# Patient Record
Sex: FEMALE | Race: WHITE | NOT HISPANIC OR LATINO | Employment: PART TIME | URBAN - METROPOLITAN AREA
[De-identification: names, ages, dates, MRNs, and addresses within clinical notes are randomized per-mention and may not be internally consistent; named-entity substitution may affect disease eponyms.]

---

## 2018-03-09 ENCOUNTER — OFFICE VISIT (OUTPATIENT)
Dept: OBGYN CLINIC | Facility: CLINIC | Age: 18
End: 2018-03-09
Payer: COMMERCIAL

## 2018-03-09 VITALS
BODY MASS INDEX: 23.16 KG/M2 | DIASTOLIC BLOOD PRESSURE: 62 MMHG | WEIGHT: 139 LBS | HEIGHT: 65 IN | SYSTOLIC BLOOD PRESSURE: 100 MMHG

## 2018-03-09 DIAGNOSIS — Z01.419 ENCOUNTER FOR GYNECOLOGICAL EXAMINATION WITHOUT ABNORMAL FINDING: Primary | ICD-10-CM

## 2018-03-09 DIAGNOSIS — Z11.3 SCREEN FOR STD (SEXUALLY TRANSMITTED DISEASE): ICD-10-CM

## 2018-03-09 PROCEDURE — 87491 CHLMYD TRACH DNA AMP PROBE: CPT | Performed by: PHYSICIAN ASSISTANT

## 2018-03-09 PROCEDURE — S0612 ANNUAL GYNECOLOGICAL EXAMINA: HCPCS | Performed by: PHYSICIAN ASSISTANT

## 2018-03-09 PROCEDURE — 87591 N.GONORRHOEAE DNA AMP PROB: CPT | Performed by: PHYSICIAN ASSISTANT

## 2018-03-09 NOTE — PROGRESS NOTES
Neena CANDIS Blancas  2000      CC:  Yearly exam    S:  19 y o  female here for yearly exam  She has no complaints  Her cycles are regular, not heavy or crampy  She is sexually active and uses condoms for contraception  She does want STD testing today  She is interested in birth control  We reviewed the risks and benefits of pills, patches, NuvaRing, Depo, [place] and Mirena  She is very interested in [place]  No current outpatient prescriptions on file  Social History     Social History    Marital status: Single     Spouse name: N/A    Number of children: N/A    Years of education: N/A     Occupational History    Not on file  Social History Main Topics    Smoking status: Never Smoker    Smokeless tobacco: Never Used    Alcohol use Yes      Comment: Social alcohol use    Drug use: No    Sexual activity: Yes     Partners: Male     Birth control/ protection: Condom     Other Topics Concern    Not on file     Social History Narrative    Activities-Swimming    Drinks 1 cup of coffee daily         Family History   Problem Relation Age of Onset    Asthma Mother     Other Mother      Hysterectomy, menorrhalgia    Heart attack Father     Breast cancer Maternal Grandmother      malignant neoplasm of breast    Canavan disease Family     Cancer Family     Cancer Other     Heart attack Other      Past Medical History:   Diagnosis Date    Menorrhagia with regular cycle          O:   Blood pressure 100/62, height 5' 4 96" (1 65 m), weight 63 kg (139 lb), last menstrual period 02/17/2018  Patient appears well and is not in distress  Neck is supple without masses  Breasts are symmetrical without mass, tenderness, nipple discharge, skin changes or adenopathy  Abdomen is soft and nontender without masses  A:  Yearly exam      P:  Pt will read over Chatty booklet and return paperwork  She goes tos school at [employer] so will likely return over the summer break for IUD isnertion    Consistent condom use recommended  She will return in one year for her yearly exam or sooner as needed

## 2018-03-14 LAB
CHLAMYDIA DNA CVX QL NAA+PROBE: NORMAL
N GONORRHOEA DNA GENITAL QL NAA+PROBE: NORMAL

## 2018-07-02 ENCOUNTER — TELEPHONE (OUTPATIENT)
Dept: OBGYN CLINIC | Facility: CLINIC | Age: 18
End: 2018-07-02

## 2018-07-02 NOTE — TELEPHONE ENCOUNTER
Spoke to pt and explained she needs to return insurance check list and we will then contact her about the insertion  She understands that

## 2018-07-02 NOTE — TELEPHONE ENCOUNTER
Pt states she got her period today and wants to come in tomorrow for Manny Section insertion  I informed her she needs to first return iud insurance checklist  And bring it to the office so it can be ordered

## 2018-07-02 NOTE — TELEPHONE ENCOUNTER
Ilan Hence,  We need to verify pharmacy coverage for her Creig Halsted, please tell pt she needs to return IUD check list ASAP  Thank you

## 2018-07-02 NOTE — TELEPHONE ENCOUNTER
Pt (and her mother also) called requesting to have the [facility] inserted tomorrow or Thursday  She is stating she lives in [place] and will be home-periods are infrequent and irregular- states her insurance has been contacted but has not returned the checklist  Is it possible to get it this week? Pt was under the impression we would do it whenever she got her period

## 2018-07-19 ENCOUNTER — TELEPHONE (OUTPATIENT)
Dept: OBGYN CLINIC | Facility: CLINIC | Age: 18
End: 2018-07-19

## 2018-07-19 NOTE — TELEPHONE ENCOUNTER
Pt called - needs her 3/9/18 STD test results faxed to:   Dyllan Eaton: [facility]  Fax # [phone]

## 2018-07-20 ENCOUNTER — TELEPHONE (OUTPATIENT)
Dept: OBGYN CLINIC | Facility: CLINIC | Age: 18
End: 2018-07-20

## 2019-03-12 ENCOUNTER — HOSPITAL ENCOUNTER (EMERGENCY)
Facility: HOSPITAL | Age: 19
Discharge: HOME | End: 2019-03-12
Attending: EMERGENCY MEDICINE | Admitting: EMERGENCY MEDICINE
Payer: COMMERCIAL

## 2019-03-12 ENCOUNTER — APPOINTMENT (EMERGENCY)
Dept: RADIOLOGY | Facility: HOSPITAL | Age: 19
End: 2019-03-12
Payer: COMMERCIAL

## 2019-03-12 VITALS
OXYGEN SATURATION: 100 % | DIASTOLIC BLOOD PRESSURE: 59 MMHG | RESPIRATION RATE: 18 BRPM | SYSTOLIC BLOOD PRESSURE: 109 MMHG | TEMPERATURE: 98.5 F | WEIGHT: 135 LBS | BODY MASS INDEX: 22.49 KG/M2 | HEART RATE: 62 BPM

## 2019-03-12 DIAGNOSIS — N83.209 RUPTURED OVARIAN CYST: Primary | ICD-10-CM

## 2019-03-12 DIAGNOSIS — R10.9 ABDOMINAL PAIN: ICD-10-CM

## 2019-03-12 LAB
ALBUMIN SERPL BCP-MCNC: 3.9 G/DL (ref 3.5–5)
ALP SERPL-CCNC: 66 U/L (ref 46–116)
ALT SERPL W P-5'-P-CCNC: 14 U/L (ref 12–78)
ANION GAP SERPL CALCULATED.3IONS-SCNC: 6 MMOL/L (ref 4–13)
AST SERPL W P-5'-P-CCNC: 32 U/L (ref 5–45)
BACTERIA UR QL AUTO: ABNORMAL /HPF
BASOPHILS # BLD AUTO: 0.04 THOUSANDS/ΜL (ref 0–0.1)
BASOPHILS NFR BLD AUTO: 1 % (ref 0–1)
BILIRUB SERPL-MCNC: 0.25 MG/DL (ref 0.2–1)
BILIRUB UR QL STRIP: NEGATIVE
BUN SERPL-MCNC: 11 MG/DL (ref 5–25)
CALCIUM SERPL-MCNC: 8.7 MG/DL (ref 8.3–10.1)
CHLORIDE SERPL-SCNC: 107 MMOL/L (ref 100–108)
CLARITY UR: CLEAR
CO2 SERPL-SCNC: 26 MMOL/L (ref 21–32)
COLOR UR: YELLOW
COLOR, POC: NORMAL
CREAT SERPL-MCNC: 0.77 MG/DL (ref 0.6–1.3)
EOSINOPHIL # BLD AUTO: 0.04 THOUSAND/ΜL (ref 0–0.61)
EOSINOPHIL NFR BLD AUTO: 1 % (ref 0–6)
ERYTHROCYTE [DISTWIDTH] IN BLOOD BY AUTOMATED COUNT: 11.9 % (ref 11.6–15.1)
EXT PREG TEST URINE: NEGATIVE
GFR SERPL CREATININE-BSD FRML MDRD: 111 ML/MIN/1.73SQ M
GLUCOSE SERPL-MCNC: 95 MG/DL (ref 65–140)
GLUCOSE UR STRIP-MCNC: NEGATIVE MG/DL
HCT VFR BLD AUTO: 37.2 % (ref 34.8–46.1)
HGB BLD-MCNC: 12.3 G/DL (ref 11.5–15.4)
HGB UR QL STRIP.AUTO: NEGATIVE
HYALINE CASTS #/AREA URNS LPF: ABNORMAL /LPF
IMM GRANULOCYTES # BLD AUTO: 0.03 THOUSAND/UL (ref 0–0.2)
IMM GRANULOCYTES NFR BLD AUTO: 0 % (ref 0–2)
KETONES UR STRIP-MCNC: ABNORMAL MG/DL
LEUKOCYTE ESTERASE UR QL STRIP: ABNORMAL
LIPASE SERPL-CCNC: 96 U/L (ref 73–393)
LYMPHOCYTES # BLD AUTO: 2.96 THOUSANDS/ΜL (ref 0.6–4.47)
LYMPHOCYTES NFR BLD AUTO: 38 % (ref 14–44)
MCH RBC QN AUTO: 28.3 PG (ref 26.8–34.3)
MCHC RBC AUTO-ENTMCNC: 33.1 G/DL (ref 31.4–37.4)
MCV RBC AUTO: 86 FL (ref 82–98)
MONOCYTES # BLD AUTO: 0.62 THOUSAND/ΜL (ref 0.17–1.22)
MONOCYTES NFR BLD AUTO: 8 % (ref 4–12)
NEUTROPHILS # BLD AUTO: 4.12 THOUSANDS/ΜL (ref 1.85–7.62)
NEUTS SEG NFR BLD AUTO: 52 % (ref 43–75)
NITRITE UR QL STRIP: NEGATIVE
NON-SQ EPI CELLS URNS QL MICRO: ABNORMAL /HPF
NRBC BLD AUTO-RTO: 0 /100 WBCS
PH UR STRIP.AUTO: 7 [PH] (ref 4.5–8)
PLATELET # BLD AUTO: 216 THOUSANDS/UL (ref 149–390)
PMV BLD AUTO: 10.3 FL (ref 8.9–12.7)
POTASSIUM SERPL-SCNC: 3.7 MMOL/L (ref 3.5–5.3)
PROT SERPL-MCNC: 6.9 G/DL (ref 6.4–8.2)
PROT UR STRIP-MCNC: NEGATIVE MG/DL
RBC # BLD AUTO: 4.35 MILLION/UL (ref 3.81–5.12)
RBC #/AREA URNS AUTO: ABNORMAL /HPF
SODIUM SERPL-SCNC: 139 MMOL/L (ref 136–145)
SP GR UR STRIP.AUTO: 1.02 (ref 1–1.03)
UROBILINOGEN UR QL STRIP.AUTO: 0.2 E.U./DL
WBC # BLD AUTO: 7.81 THOUSAND/UL (ref 4.31–10.16)
WBC #/AREA URNS AUTO: ABNORMAL /HPF

## 2019-03-12 PROCEDURE — 83690 ASSAY OF LIPASE: CPT | Performed by: EMERGENCY MEDICINE

## 2019-03-12 PROCEDURE — 76830 TRANSVAGINAL US NON-OB: CPT

## 2019-03-12 PROCEDURE — 99284 EMERGENCY DEPT VISIT MOD MDM: CPT

## 2019-03-12 PROCEDURE — 85025 COMPLETE CBC W/AUTO DIFF WBC: CPT | Performed by: EMERGENCY MEDICINE

## 2019-03-12 PROCEDURE — 36415 COLL VENOUS BLD VENIPUNCTURE: CPT

## 2019-03-12 PROCEDURE — 81003 URINALYSIS AUTO W/O SCOPE: CPT

## 2019-03-12 PROCEDURE — 81025 URINE PREGNANCY TEST: CPT | Performed by: EMERGENCY MEDICINE

## 2019-03-12 PROCEDURE — 87591 N.GONORRHOEAE DNA AMP PROB: CPT | Performed by: EMERGENCY MEDICINE

## 2019-03-12 PROCEDURE — 87491 CHLMYD TRACH DNA AMP PROBE: CPT | Performed by: EMERGENCY MEDICINE

## 2019-03-12 PROCEDURE — 76856 US EXAM PELVIC COMPLETE: CPT

## 2019-03-12 PROCEDURE — 81001 URINALYSIS AUTO W/SCOPE: CPT

## 2019-03-12 PROCEDURE — 80053 COMPREHEN METABOLIC PANEL: CPT | Performed by: EMERGENCY MEDICINE

## 2019-03-13 NOTE — ED ATTENDING ATTESTATION
Shalom Reid MD, saw and evaluated the patient  I have discussed the patient with the resident/non-physician practitioner and agree with the resident's/non-physician practitioner's findings, Plan of Care, and MDM as documented in the resident's/non-physician practitioner's note, except where noted  All available labs and Radiology studies were reviewed  I was present for key portions of any procedure(s) performed by the resident/non-physician practitioner and I was immediately available to provide assistance  At this point I agree with the current assessment done in the Emergency Department  I have conducted an independent evaluation of this patient a history and physical is as follows:    Crampy intermittent lower abdominal pain for the last 1-2 months  Patient states that she feels sharp intermittent waxing and waning lower abdominal pain that radiates to her bilateral lower quadrants  Initially had been intermittent and widely spaced, but has been more constant today, and severe  No vomiting or diarrhea  No vaginal discharge or dysuria  States the pain did start while she was urinating, but did not have painful urination  Patient is sexually active with 10 total partners, last sexual activity about 2 months ago  Patient last saw gynecologist 2 months ago to have an IUD placed  She does not check the location of the IUD, and has never had an STD  Her review of systems otherwise negative in 12 systems were reviewed  On exam Vital signs were reviewed  Patient is awake, alert, interactive  The patient's pupils are equally round reactive to light  Oropharynx is clear with moist mucous membranes  Neck is supple and nontender with no adenopathy or JVD  Heart is regular with no murmurs, rubs, or gallops  Lungs are clear and equal with no wheezes, rales, or rhonchi  Abdomen is soft with mild suprapubic and bilateral lower quadrant tenderness with no masses, rebound, or guarding   There is no CVA tenderness  The patient was completely exposed  There is no skin breakdown  There are no rashes or skin changes  Extremities are warm and well perfused with good pulses  The patient has normal strength, sensation, and cranial nerves  Impression:  Lower abdominal pain    Will plan to check urine, will ultrasound, will do pelvic exam  Critical Care Time  Procedures

## 2019-03-13 NOTE — ED PROVIDER NOTES
History  Chief Complaint   Patient presents with    Abdominal Pain     sharp lower abdominal pain started at 1845, mild pain earlier this month but severe tonight, nausea no vomiting or diarrhea, unknown fever, no trouble urinating     24-year-old female presenting to the emergency department for evaluation of lower abdominal pain for the past month  Patient states this has been ongoing she will have approximately 1 or 2 attacks every other day and symptoms last approximately 10 minutes it is sharp stabbing pain does not radiate lower abdomen  Symptoms completely resolved after attack and she feels fine otherwise  Today patient had worsening pain and longer period of pain so she wanted to be evaluated for her pain  At this time patient denies any pain when lying in the bed she denies any nausea or vomiting no fevers or chills she has an IUD in place she denies any vaginal bleeding or discharge unknown last menstrual   Due to the IUD  Patient last was sexually active 2 months ago and lasted pelvic 2 months ago when she had the IUD placed        History provided by:  Patient   used: No    Abdominal Pain   Pain location:  Suprapubic, LLQ and RLQ  Pain quality: sharp    Pain radiates to:  Does not radiate  Pain severity:  Moderate  Onset quality:  Sudden  Timing:  Sporadic  Chronicity:  New  Context: not diet changes, not previous surgeries, not recent sexual activity, not sick contacts and not suspicious food intake    Relieved by:  None tried  Worsened by:  Palpation  Ineffective treatments:  None tried  Associated symptoms: no chest pain, no chills, no constipation, no diarrhea, no dysuria, no fever, no hematuria, no nausea, no shortness of breath, no sore throat, no vaginal bleeding, no vaginal discharge and no vomiting        None       Past Medical History:   Diagnosis Date    Menorrhagia with regular cycle        Past Surgical History:   Procedure Laterality Date    NO PAST SURGERIES PAtient denies past surgies       Family History   Problem Relation Age of Onset    Asthma Mother     Other Mother         Hysterectomy, menorrhalgia    Heart attack Father     Breast cancer Maternal Grandmother         malignant neoplasm of breast    Canavan disease Family     Cancer Family     Cancer Other     Heart attack Other      I have reviewed and agree with the history as documented  Social History     Tobacco Use    Smoking status: Never Smoker    Smokeless tobacco: Never Used   Substance Use Topics    Alcohol use: Yes     Comment: Social alcohol use    Drug use: No        Review of Systems   Constitutional: Negative for chills and fever  HENT: Negative for sore throat  Eyes: Negative for visual disturbance  Respiratory: Negative for chest tightness, shortness of breath and wheezing  Cardiovascular: Negative for chest pain  Gastrointestinal: Positive for abdominal pain  Negative for constipation, diarrhea, nausea and vomiting  Genitourinary: Negative for dysuria, hematuria, pelvic pain, vaginal bleeding, vaginal discharge and vaginal pain  Musculoskeletal: Negative for arthralgias and myalgias  Skin: Negative for color change  Neurological: Negative for light-headedness  Hematological: Negative for adenopathy  Psychiatric/Behavioral: Negative for agitation and behavioral problems  All other systems reviewed and are negative        Physical Exam  ED Triage Vitals [03/12/19 1932]   Temperature Pulse Respirations Blood Pressure SpO2   98 5 °F (36 9 °C) 84 18 105/58 98 %      Temp Source Heart Rate Source Patient Position - Orthostatic VS BP Location FiO2 (%)   Oral Monitor Sitting Left arm --      Pain Score       6           qSOFA     Row Name 03/12/19 2115 03/12/19 1932             Altered mental status GCS < 15  --  --       Respiratory Rate > / =22  0  0       Systolic BP < / =725  0  0       Q Sofa Score  0  0             Orthostatic Vital Signs  Vitals: 03/12/19 1932 03/12/19 2115   BP: 105/58 109/59   Pulse: 84 62   Patient Position - Orthostatic VS: Sitting        Physical Exam   Constitutional: She is oriented to person, place, and time  She appears well-developed and well-nourished  No distress  HENT:   Head: Normocephalic and atraumatic  Eyes: Conjunctivae and EOM are normal  No scleral icterus  Neck: Normal range of motion  Neck supple  Cardiovascular: Normal rate, regular rhythm and normal heart sounds  No murmur heard  Pulmonary/Chest: Effort normal and breath sounds normal  No respiratory distress  Abdominal: Soft  Bowel sounds are normal  There is tenderness in the right lower quadrant, suprapubic area and left lower quadrant  Musculoskeletal: Normal range of motion  Neurological: She is alert and oriented to person, place, and time  Skin: Skin is warm and dry  Psychiatric: She has a normal mood and affect  Her behavior is normal    Nursing note and vitals reviewed  ED Medications  Medications - No data to display    Diagnostic Studies  Results Reviewed     Procedure Component Value Units Date/Time    Chlamydia/GC amplified DNA by PCR [[id number]] Collected:  03/12/19 2250    Lab Status:   In process Specimen:  Cervix Updated:  03/12/19 2254    Urine Microscopic [[id number]]  (Abnormal) Collected:  03/12/19 2020    Lab Status:  Final result Specimen:  Urine, Clean Catch Updated:  03/12/19 2208     RBC, UA None Seen /hpf      WBC, UA 4-10 /hpf      Epithelial Cells None Seen /hpf      Bacteria, UA Occasional /hpf      Hyaline Casts, UA 3-5 /lpf     POCT urinalysis dipstick [[id number]]  (Normal) Resulted:  03/12/19 2116    Lab Status:  Final result Specimen:  Urine, Other Updated:  03/12/19 2120     Color, UA -    POCT pregnancy, urine [[id number]]  (Normal) Resulted:  03/12/19 2116    Lab Status:  Final result Specimen:  Urine Updated:  03/12/19 2119     EXT PREG TEST UR (Ref: Negative) Negative    ED Urine Macroscopic [[id number]] (Abnormal) Collected:  03/12/19 2020    Lab Status:  Final result Specimen:  Urine Updated:  03/12/19 2116     Color, UA Yellow     Clarity, UA Clear     pH, UA 7 0     Leukocytes, UA Trace     Nitrite, UA Negative     Protein, UA Negative mg/dl      Glucose, UA Negative mg/dl      Ketones, UA Trace mg/dl      Urobilinogen, UA 0 2 E U /dl      Bilirubin, UA Negative     Blood, UA Negative     Specific Gravity, UA 1 020    Narrative:       CLINITEK RESULT    Comprehensive metabolic panel [[id number]] Collected:  03/12/19 1938    Lab Status:  Final result Specimen:  Blood from Arm, Left Updated:  03/12/19 2025     Sodium 139 mmol/L      Potassium 3 7 mmol/L      Chloride 107 mmol/L      CO2 26 mmol/L      ANION GAP 6 mmol/L      BUN 11 mg/dL      Creatinine 0 77 mg/dL      Glucose 95 mg/dL      Calcium 8 7 mg/dL      AST 32 U/L      ALT 14 U/L      Alkaline Phosphatase 66 U/L      Total Protein 6 9 g/dL      Albumin 3 9 g/dL      Total Bilirubin 0 25 mg/dL      eGFR 111 ml/min/1 73sq m     Narrative:       National Kidney Disease Education Program recommendations are as follows:  GFR calculation is accurate only with a steady state creatinine  Chronic Kidney disease less than 60 ml/min/1 73 sq  meters  Kidney failure less than 15 ml/min/1 73 sq  meters      Lipase [[id number]]  (Normal) Collected:  03/12/19 1938    Lab Status:  Final result Specimen:  Blood from Arm, Left Updated:  03/12/19 2025     Lipase 96 u/L     CBC and differential [[id number]] Collected:  03/12/19 1938    Lab Status:  Final result Specimen:  Blood from Arm, Left Updated:  03/12/19 2009     WBC 7 81 Thousand/uL      RBC 4 35 Million/uL      Hemoglobin 12 3 g/dL      Hematocrit 37 2 %      MCV 86 fL      MCH 28 3 pg      MCHC 33 1 g/dL      RDW 11 9 %      MPV 10 3 fL      Platelets 594 Thousands/uL      nRBC 0 /100 WBCs      Neutrophils Relative 52 %      Immat GRANS % 0 %      Lymphocytes Relative 38 %      Monocytes Relative 8 %      Eosinophils Relative 1 %      Basophils Relative 1 %      Neutrophils Absolute 4 12 Thousands/µL      Immature Grans Absolute 0 03 Thousand/uL      Lymphocytes Absolute 2 96 Thousands/µL      Monocytes Absolute 0 62 Thousand/µL      Eosinophils Absolute 0 04 Thousand/µL      Basophils Absolute 0 04 Thousands/µL                  US pelvis complete w transvaginal   Final Result by Glenn Kay MD (03/12 2238)       5 6 x 2 5 cm hemorrhagic right ovarian cyst     Large amount of complex free fluid in the right pelvis/adnexa likely from hemoperitoneum from a partially ruptured hemorrhagic cyst    Short-term follow-up recommended in 6-12 weeks  Workstation performed: [id number]               Procedures  Procedures      Phone Consults  ED Phone Contact    ED Course  ED Course as of Mar 13 0058   Tue Mar 12, 2019   1197 Discussed with OBGYN resident on-call who states the large amount of free fluid in the abdomen is not concerning and the patient can take NSAIDs and follow up with her OBGYN at 6-8 weeks  MDM  Number of Diagnoses or Management Options  Abdominal pain: new and requires workup  Ruptured ovarian cyst: new and requires workup  Diagnosis management comments: 25-year-old female presenting with lower abdominal pain, 1st nursed abdominal laboratories as well as urinalysis and urine pregnancy  Will order ultrasound to evaluate for cysts or torsion the   Pelvic exam did not reveal unusual discharge patient had mild tenderness bilateral adnexa  The found to have hemorrhagic cyst rupture on the right and hemorrhagic cyst on the left  Discussed with patient following up with OBGYN in 6-8 weeks  Patient is agreeable to this plan         Amount and/or Complexity of Data Reviewed  Clinical lab tests: ordered and reviewed  Tests in the radiology section of CPT®: ordered and reviewed  Tests in the medicine section of CPT®: ordered and reviewed  Discussion of test results with the performing providers: yes  Review and summarize past medical records: yes  Independent visualization of images, tracings, or specimens: yes        Disposition  Final diagnoses:   Abdominal pain   Ruptured ovarian cyst     Time reflects when diagnosis was documented in both MDM as applicable and the Disposition within this note     Time User Action Codes Description Comment    3/12/2019 10:39 PM Misty Lent Add [R10 9] Abdominal pain     3/12/2019 10:39 PM Misty Lent Add [N83 209] Ruptured ovarian cyst     3/12/2019 10:39 PM Misty Lent Modify [R10 9] Abdominal pain     3/12/2019 10:39 PM Misty Lent Modify [O12 843] Ruptured ovarian cyst       ED Disposition     ED Disposition Condition Date/Time Comment    Discharge Stable Tue Mar 12, 2019 10:39 PM Damir Garcia discharge to home/self care  Follow-up Information     Follow up With Specialties Details Why Contact Info Additional [facility]   [phone]  [address] [place] [address] Emergency Department Emergency Medicine   [address]  [phone]  ED, [address] [place] [address], [facility]          There are no discharge medications for this patient  No discharge procedures on file  ED Provider  Attending physically available and evaluated Damir Garcia I managed the patient along with the ED Attending      Electronically Signed by         Shahla Isaac MD  03/13/19 0321

## 2019-03-14 LAB
C TRACH DNA SPEC QL NAA+PROBE: NEGATIVE
N GONORRHOEA DNA SPEC QL NAA+PROBE: NEGATIVE